# Patient Record
Sex: FEMALE | Race: WHITE | NOT HISPANIC OR LATINO | Employment: FULL TIME | URBAN - METROPOLITAN AREA
[De-identification: names, ages, dates, MRNs, and addresses within clinical notes are randomized per-mention and may not be internally consistent; named-entity substitution may affect disease eponyms.]

---

## 2020-12-26 ENCOUNTER — HOSPITAL ENCOUNTER (EMERGENCY)
Facility: HOSPITAL | Age: 40
Discharge: HOME | End: 2020-12-26
Attending: EMERGENCY MEDICINE
Payer: COMMERCIAL

## 2020-12-26 ENCOUNTER — APPOINTMENT (EMERGENCY)
Dept: RADIOLOGY | Facility: HOSPITAL | Age: 40
End: 2020-12-26
Payer: COMMERCIAL

## 2020-12-26 VITALS
DIASTOLIC BLOOD PRESSURE: 68 MMHG | RESPIRATION RATE: 18 BRPM | HEART RATE: 77 BPM | TEMPERATURE: 98.4 F | OXYGEN SATURATION: 100 % | HEIGHT: 68 IN | BODY MASS INDEX: 24.25 KG/M2 | WEIGHT: 160 LBS | SYSTOLIC BLOOD PRESSURE: 107 MMHG

## 2020-12-26 DIAGNOSIS — S52.514A NONDISPLACED FRACTURE OF RIGHT RADIAL STYLOID PROCESS, INITIAL ENCOUNTER FOR CLOSED FRACTURE: Primary | ICD-10-CM

## 2020-12-26 PROCEDURE — 99283 EMERGENCY DEPT VISIT LOW MDM: CPT

## 2020-12-26 PROCEDURE — 99283 EMERGENCY DEPT VISIT LOW MDM: CPT | Performed by: EMERGENCY MEDICINE

## 2020-12-26 PROCEDURE — 29125 APPL SHORT ARM SPLINT STATIC: CPT | Performed by: EMERGENCY MEDICINE

## 2020-12-26 PROCEDURE — 73110 X-RAY EXAM OF WRIST: CPT

## 2020-12-26 RX ORDER — VITAMIN E (DL,TOCOPHERYL ACET) 180 MG
12.5 CAPSULE ORAL DAILY
COMMUNITY

## 2020-12-29 ENCOUNTER — OFFICE VISIT (OUTPATIENT)
Dept: OBGYN CLINIC | Facility: HOSPITAL | Age: 40
End: 2020-12-29
Payer: COMMERCIAL

## 2020-12-29 VITALS
HEART RATE: 69 BPM | BODY MASS INDEX: 29.07 KG/M2 | WEIGHT: 191.8 LBS | HEIGHT: 68 IN | SYSTOLIC BLOOD PRESSURE: 114 MMHG | DIASTOLIC BLOOD PRESSURE: 73 MMHG

## 2020-12-29 DIAGNOSIS — S52.514A CLOSED NONDISPLACED FRACTURE OF STYLOID PROCESS OF RIGHT RADIUS, INITIAL ENCOUNTER: Primary | ICD-10-CM

## 2020-12-29 PROCEDURE — 29075 APPL CST ELBW FNGR SHORT ARM: CPT | Performed by: PHYSICIAN ASSISTANT

## 2020-12-29 PROCEDURE — 99203 OFFICE O/P NEW LOW 30 MIN: CPT | Performed by: PHYSICIAN ASSISTANT

## 2021-02-05 ENCOUNTER — OFFICE VISIT (OUTPATIENT)
Dept: OBGYN CLINIC | Facility: HOSPITAL | Age: 41
End: 2021-02-05
Payer: COMMERCIAL

## 2021-02-05 ENCOUNTER — HOSPITAL ENCOUNTER (OUTPATIENT)
Dept: RADIOLOGY | Facility: HOSPITAL | Age: 41
Discharge: HOME | End: 2021-02-05
Attending: SURGERY
Payer: COMMERCIAL

## 2021-02-05 VITALS
BODY MASS INDEX: 28.1 KG/M2 | HEIGHT: 68 IN | DIASTOLIC BLOOD PRESSURE: 74 MMHG | WEIGHT: 185.4 LBS | SYSTOLIC BLOOD PRESSURE: 113 MMHG | HEART RATE: 71 BPM

## 2021-02-05 DIAGNOSIS — S52.514A CLOSED NONDISPLACED FRACTURE OF STYLOID PROCESS OF RIGHT RADIUS, INITIAL ENCOUNTER: Primary | ICD-10-CM

## 2021-02-05 DIAGNOSIS — S52.514A CLOSED NONDISPLACED FRACTURE OF STYLOID PROCESS OF RIGHT RADIUS, INITIAL ENCOUNTER: ICD-10-CM

## 2021-02-05 PROCEDURE — 99213 OFFICE O/P EST LOW 20 MIN: CPT | Performed by: SURGERY

## 2021-02-05 PROCEDURE — 73110 X-RAY EXAM OF WRIST: CPT

## 2021-02-05 NOTE — PROGRESS NOTES
ASSESSMENT/PLAN:      47year old female here for her right radial styloid process fracture  New xrays were obtained today and reviewed with the patient that shows  progressivehealing  Due to her being on , it's recommended to use the cock up wrist brace just for the skiing  Instructed to modify her workouts until she gets all her motion back  We will see her as needed  The patient verbalized understanding of exam findings and treatment plan  We engaged in the shared decision-making process and treatment options were discussed at length with the patient  Surgical and conservative management discussed today along with risks and benefits  Diagnoses and all orders for this visit:    Closed nondisplaced fracture of styloid process of right radius, initial encounter  -     XR wrist 3+ vw right; Future  -     Cock Up Wrist Splint        Follow Up:  Return if symptoms worsen or fail to improve  To Do Next Visit:  Re-evaluation of current issue    ____________________________________________________________________________________________________________________________________________      CHIEF COMPLAINT:  Chief Complaint   Patient presents with    Right Wrist - Fracture       SUBJECTIVE:  Dior Cali is a 47y o  year old RHD female who presents today for a consultation for her right nondisplaced radial styloid process fracture that was sustained on 12/25/2020 when she fell doing wheelies on a hoverboard landing on an outstretched hand  She was transitioned out of a splint into a [address] on 12/29/2020  I have personally reviewed all the relevant PMH, PSH, SH, FH, Medications and allergies  PAST MEDICAL HISTORY:  History reviewed  No pertinent past medical history  PAST SURGICAL HISTORY:  History reviewed  No pertinent surgical history  FAMILY HISTORY:  History reviewed  No pertinent family history      SOCIAL HISTORY:  Social History     Tobacco Use    Smoking status: Never Smoker  Smokeless tobacco: Never Used   Substance Use Topics    Alcohol use: Yes     Frequency: Monthly or less    Drug use: Never       MEDICATIONS:    Current Outpatient Medications:     Melatonin 12 MG TBDP, Take 12 5 mg by mouth daily, Disp: , Rfl:     methylPREDNISolone 4 MG tablet therapy pack, Use as directed on package (Patient not taking: Reported on 12/26/2020), Disp: 1 each, Rfl: 0    ALLERGIES:  No Known Allergies    REVIEW OF SYSTEMS:  Review of Systems   Constitutional: Negative for chills, fever and unexpected weight change  HENT: Negative for hearing loss, nosebleeds and sore throat  Eyes: Negative for pain, redness and visual disturbance  Respiratory: Negative for cough, shortness of breath and wheezing  Cardiovascular: Negative for chest pain, palpitations and leg swelling  Gastrointestinal: Negative for abdominal pain and nausea  Genitourinary: Negative for dyspareunia, dysuria and frequency  Skin: Negative for rash and wound  Neurological: Negative for dizziness, numbness and headaches  Psychiatric/Behavioral: Negative for decreased concentration and suicidal ideas  The patient is not nervous/anxious          VITALS:  Vitals:    02/05/21 1317   BP: 113/74   Pulse: 71       LABS:  HgA1c: No results found for: HGBA1C  BMP: No results found for: GLUCOSE, CALCIUM, NA, K, CO2, CL, BUN, CREATININE    _____________________________________________________  PHYSICAL EXAMINATION:  General: well developed and well nourished, alert, oriented times 3 and appears comfortable  Psychiatric: Normal  HEENT: Normocephalic, Atraumatic Trachea Midline, No torticollis  Pulmonary: No audible wheezing or respiratory distress   Cardiovascular: No pitting edema, 2+ radial pulse   Skin: No masses, erythema, lacerations, fluctation, ulcerations  Neurovascular: Sensation Intact to the Median, Ulnar, Radial Nerve, Motor Intact to the Median, Ulnar, Radial Nerve and Pulses Intact  Musculoskeletal: Normal, except as noted in detailed exam and in HPI        MUSCULOSKELETAL EXAMINATION:     Right wrist:    No swelling within the wrist joint  No tenderness at the fracture site over the radial styloid process  Wrist flexion 40 degrees Wrist extension 40 degrees full pronation and supination   Sensation intact to light touch  Brisk capillary refill     ___________________________________________________  STUDIES REVIEWED:  I have personally reviewed AP lateral and oblique radiographs of Right wrist 3 views  which demonstrate  Healing nondisplaced radial styloid fracture           PROCEDURES PERFORMED:  Procedures  No Procedures performed today    _____________________________________________________      Kika Rich    I,:  Misa Venegas am acting as a scribe while in the presence of the attending physician :       I,:  Ramu Epperson MD personally performed the services described in this documentation    as scribed in my presence :

## 2025-07-25 ENCOUNTER — PATIENT OUTREACH (OUTPATIENT)
Age: 45
End: 2025-07-25

## 2025-07-25 NOTE — PROGRESS NOTES
PCP placed a [facility] surgical oncology referral.  Pt has imaging scheduled 8/25/25.    TC to pt to discuss her concerns. Pt had a negative MM 3/31/25 at [facility] and a negative bx 8/12/25.  Pt states she has had concerns since before her biopsy    Offered pt an appt with a NP prior to her imaging, but pt prefers to wait for the results of the imaging.  Pt agreed to call with any concerns or if she notices any changes.     Support provided and encouraged pt to call at any time.

## 2025-07-28 ENCOUNTER — HOSPITAL ENCOUNTER (OUTPATIENT)
Dept: ULTRASOUND IMAGING | Facility: CLINIC | Age: 45
Discharge: HOME | End: 2025-07-28
Attending: NURSE PRACTITIONER
Payer: COMMERCIAL

## 2025-07-28 ENCOUNTER — HOSPITAL ENCOUNTER (OUTPATIENT)
Dept: MAMMOGRAPHY | Facility: CLINIC | Age: 45
Discharge: HOME | End: 2025-07-28
Attending: NURSE PRACTITIONER
Payer: COMMERCIAL

## 2025-07-28 VITALS — BODY MASS INDEX: 26.52 KG/M2 | HEIGHT: 68 IN | WEIGHT: 175 LBS

## 2025-07-28 DIAGNOSIS — N63.20 LARGE MASS OF LEFT BREAST: ICD-10-CM

## 2025-07-28 PROCEDURE — 76642 ULTRASOUND BREAST LIMITED: CPT

## 2025-08-04 ENCOUNTER — OFFICE VISIT (OUTPATIENT)
Age: 45
End: 2025-08-04
Payer: COMMERCIAL